# Patient Record
Sex: FEMALE | Race: WHITE | NOT HISPANIC OR LATINO | Employment: FULL TIME | URBAN - NONMETROPOLITAN AREA
[De-identification: names, ages, dates, MRNs, and addresses within clinical notes are randomized per-mention and may not be internally consistent; named-entity substitution may affect disease eponyms.]

---

## 2022-12-02 ENCOUNTER — TRANSCRIBE ORDERS (OUTPATIENT)
Dept: MAMMOGRAPHY | Facility: CLINIC | Age: 57
End: 2022-12-02

## 2022-12-02 DIAGNOSIS — Z12.31 ENCOUNTER FOR SCREENING MAMMOGRAM FOR MALIGNANT NEOPLASM OF BREAST: Primary | ICD-10-CM

## 2022-12-12 ENCOUNTER — OFFICE VISIT (OUTPATIENT)
Dept: FAMILY MEDICINE CLINIC | Facility: CLINIC | Age: 57
End: 2022-12-12

## 2022-12-12 VITALS
OXYGEN SATURATION: 98 % | DIASTOLIC BLOOD PRESSURE: 90 MMHG | HEIGHT: 65 IN | SYSTOLIC BLOOD PRESSURE: 140 MMHG | HEART RATE: 84 BPM | BODY MASS INDEX: 33.66 KG/M2 | WEIGHT: 202 LBS

## 2022-12-12 DIAGNOSIS — R01.1 CARDIAC MURMUR: ICD-10-CM

## 2022-12-12 DIAGNOSIS — E55.9 VITAMIN D DEFICIENCY: ICD-10-CM

## 2022-12-12 DIAGNOSIS — E89.0 HISTORY OF PARTIAL THYROIDECTOMY: ICD-10-CM

## 2022-12-12 DIAGNOSIS — E53.8 VITAMIN B12 DEFICIENCY: ICD-10-CM

## 2022-12-12 DIAGNOSIS — R03.0 ELEVATED BP WITHOUT DIAGNOSIS OF HYPERTENSION: ICD-10-CM

## 2022-12-12 DIAGNOSIS — Z13.1 DIABETES MELLITUS SCREENING: ICD-10-CM

## 2022-12-12 DIAGNOSIS — E66.09 CLASS 1 OBESITY DUE TO EXCESS CALORIES WITHOUT SERIOUS COMORBIDITY WITH BODY MASS INDEX (BMI) OF 33.0 TO 33.9 IN ADULT: ICD-10-CM

## 2022-12-12 DIAGNOSIS — Z00.00 GENERAL MEDICAL EXAM: Primary | ICD-10-CM

## 2022-12-12 DIAGNOSIS — K90.41 GLUTEN INTOLERANCE: ICD-10-CM

## 2022-12-12 PROCEDURE — 99386 PREV VISIT NEW AGE 40-64: CPT | Performed by: FAMILY MEDICINE

## 2022-12-12 NOTE — ASSESSMENT & PLAN NOTE
Reviewed together health maintenance and screening along with vaccination options.    She will consider Tdap and Shingrix.    She will return to get fasting blood work up-to-date.    I did discuss murmur heard on her exam today.  She reports a history of murmur but no prior work-up.  She has experienced some harder heartbeats when laying flat at nighttime and is interested in seeing cardiology to help clarify the etiology of her murmur and consider additional work-up for her nighttime symptoms.    Scheduling consultation with Dr. Madrigal

## 2022-12-12 NOTE — PROGRESS NOTES
"Chief Complaint  Establish Care (Patient is here to establish care. )    Subjective    History of Present Illness:  Izabel Ramírez is a 57 y.o. female who presents today for physical exam and to establish care.    She is transferring to us from [facility].    History of partial thyroidectomy due to goiter and she does continue with monitoring of her thyroid on a regular basis.  She has not required Synthroid or thyroid replacement of any kind.    She does have a history of gluten intolerance/celiac disease.  Symptoms are better with dietary control but she does tend to run low on vitamin B12 and vitamin D.  She is on over-the-counter supplementation.    Colonoscopy up-to-date.    Pap smear up-to-date.    Family history of hypertension and her blood pressures been borderline at past visits.  She has not been working as hard with diet and exercise and plans to restart this.  She would like to restart diet and exercise before medications at this time.    She will return to get fasting blood work up-to-date.        Objective   Vital Signs:   /90 (BP Location: Right arm, Patient Position: Sitting, Cuff Size: Adult)   Pulse 84   Ht 165.1 cm (65\")   Wt 91.6 kg (202 lb)   SpO2 98%   BMI 33.61 kg/m²     Review of Systems   Constitutional: Positive for fatigue. Negative for appetite change, chills and fever.   HENT: Negative for hearing loss.    Eyes: Negative for blurred vision.   Respiratory: Negative for chest tightness.    Cardiovascular: Negative for chest pain.   Gastrointestinal: Negative for abdominal pain.   Musculoskeletal: Negative for gait problem.   Skin: Negative for rash.   Psychiatric/Behavioral: Negative for depressed mood.       Past History:  Medical History: has no past medical history on file.   Surgical History: has no past surgical history on file.   Family History: family history is not on file.   Social History: reports that she has never smoked. She has never used " smokeless tobacco. Alcohol use questions deferred to the physician. She reports that she does not use drugs.    No current outpatient medications on file.    Allergies: Patient has no known allergies.    Physical Exam  Constitutional:       Appearance: She is obese.   HENT:      Head: Normocephalic.      Right Ear: External ear normal.      Left Ear: External ear normal.      Nose: Nose normal.   Eyes:      Pupils: Pupils are equal, round, and reactive to light.   Neck:      Comments: Postop scar from partial thyroidectomy.  No thyroid mass or nodule appreciated  Cardiovascular:      Rate and Rhythm: Normal rate and regular rhythm.      Heart sounds: Murmur heard.     No friction rub. No gallop.      Comments: 2 out of 6 systolic ejection murmur heard loudest at right upper sternal border consistent with aortic valve etiology  Pulmonary:      Effort: Pulmonary effort is normal.      Breath sounds: Normal breath sounds.   Musculoskeletal:         General: Normal range of motion.      Cervical back: Normal range of motion.   Skin:     General: Skin is warm and dry.   Neurological:      General: No focal deficit present.      Mental Status: She is alert.   Psychiatric:         Mood and Affect: Mood normal.         Behavior: Behavior normal.         Thought Content: Thought content normal.          Result Review                   Assessment and Plan  Diagnoses and all orders for this visit:    1. General medical exam (Primary)  Assessment & Plan:  Reviewed together health maintenance and screening along with vaccination options.    She will consider Tdap and Shingrix.    She will return to get fasting blood work up-to-date.    I did discuss murmur heard on her exam today.  She reports a history of murmur but no prior work-up.  She has experienced some harder heartbeats when laying flat at nighttime and is interested in seeing cardiology to help clarify the etiology of her murmur and consider additional work-up for her  nighttime symptoms.    Scheduling consultation with Dr. Madrigal    Orders:  -     CBC Auto Differential; Future  -     Comprehensive Metabolic Panel; Future  -     Lipid Panel; Future  -     TSH; Future  -     T4, Free; Future    2. Vitamin B12 deficiency  Assessment & Plan:  Continues on B12 over-the-counter    Orders:  -     CBC Auto Differential; Future  -     Vitamin B12; Future    3. History of partial thyroidectomy  Assessment & Plan:  We will reassess thyroid function with fasting labs      4. Vitamin D deficiency  Assessment & Plan:  Continues on vitamin D over-the-counter    Orders:  -     Vitamin D,25-Hydroxy; Future    5. Gluten intolerance  Assessment & Plan:  Continue with gluten-free diet      6. Diabetes mellitus screening  -     Hemoglobin A1c; Future    7. Elevated BP without diagnosis of hypertension  Assessment & Plan:  Discussed borderline blood pressure elevation and she wants to work on diet and exercise before medications.    Scheduling cardiology consultation given murmur appreciated on exam.  We did discuss the possibility for aortic stenosis and bicuspid aortic valve.    Orders:  -     Ambulatory Referral to Cardiology    8. Cardiac murmur  Assessment & Plan:  See above.  Referral underway to cardiology    Orders:  -     Ambulatory Referral to Cardiology    9. Class 1 obesity due to excess calories without serious comorbidity with body mass index (BMI) of 33.0 to 33.9 in adult      BMI is >= 30 and <35. (Class 1 Obesity). The following options were offered after discussion;: exercise counseling/recommendations and nutrition counseling/recommendations          Follow Up  No follow-ups on file.    Lionel Sosa MD

## 2022-12-12 NOTE — ASSESSMENT & PLAN NOTE
Discussed borderline blood pressure elevation and she wants to work on diet and exercise before medications.    Scheduling cardiology consultation given murmur appreciated on exam.  We did discuss the possibility for aortic stenosis and bicuspid aortic valve.

## 2022-12-16 ENCOUNTER — LAB (OUTPATIENT)
Dept: FAMILY MEDICINE CLINIC | Facility: CLINIC | Age: 57
End: 2022-12-16

## 2022-12-16 DIAGNOSIS — Z00.00 GENERAL MEDICAL EXAM: ICD-10-CM

## 2022-12-16 DIAGNOSIS — E53.8 VITAMIN B12 DEFICIENCY: ICD-10-CM

## 2022-12-16 DIAGNOSIS — Z13.1 DIABETES MELLITUS SCREENING: ICD-10-CM

## 2022-12-16 DIAGNOSIS — E55.9 VITAMIN D DEFICIENCY: ICD-10-CM

## 2022-12-16 PROCEDURE — 36415 COLL VENOUS BLD VENIPUNCTURE: CPT | Performed by: FAMILY MEDICINE

## 2022-12-17 LAB
25(OH)D3+25(OH)D2 SERPL-MCNC: 46.5 NG/ML (ref 30–100)
ALBUMIN SERPL-MCNC: 4.3 G/DL (ref 3.8–4.9)
ALBUMIN/GLOB SERPL: 2 {RATIO} (ref 1.2–2.2)
ALP SERPL-CCNC: 90 IU/L (ref 44–121)
ALT SERPL-CCNC: 9 IU/L (ref 0–32)
AST SERPL-CCNC: 10 IU/L (ref 0–40)
BASOPHILS # BLD AUTO: 0 X10E3/UL (ref 0–0.2)
BASOPHILS NFR BLD AUTO: 1 %
BILIRUB SERPL-MCNC: 0.5 MG/DL (ref 0–1.2)
BUN SERPL-MCNC: 13 MG/DL (ref 6–24)
BUN/CREAT SERPL: 19 (ref 9–23)
CALCIUM SERPL-MCNC: 9.3 MG/DL (ref 8.7–10.2)
CHLORIDE SERPL-SCNC: 103 MMOL/L (ref 96–106)
CHOLEST SERPL-MCNC: 235 MG/DL (ref 100–199)
CO2 SERPL-SCNC: 26 MMOL/L (ref 20–29)
CREAT SERPL-MCNC: 0.7 MG/DL (ref 0.57–1)
EGFRCR SERPLBLD CKD-EPI 2021: 100 ML/MIN/1.73
EOSINOPHIL # BLD AUTO: 0.4 X10E3/UL (ref 0–0.4)
EOSINOPHIL NFR BLD AUTO: 7 %
ERYTHROCYTE [DISTWIDTH] IN BLOOD BY AUTOMATED COUNT: 12.7 % (ref 11.7–15.4)
GLOBULIN SER CALC-MCNC: 2.2 G/DL (ref 1.5–4.5)
GLUCOSE SERPL-MCNC: 92 MG/DL (ref 70–99)
HBA1C MFR BLD: 5.5 % (ref 4.8–5.6)
HCT VFR BLD AUTO: 43.3 % (ref 34–46.6)
HDLC SERPL-MCNC: 78 MG/DL
HGB BLD-MCNC: 14.3 G/DL (ref 11.1–15.9)
IMM GRANULOCYTES # BLD AUTO: 0 X10E3/UL (ref 0–0.1)
IMM GRANULOCYTES NFR BLD AUTO: 0 %
LDLC SERPL CALC-MCNC: 139 MG/DL (ref 0–99)
LYMPHOCYTES # BLD AUTO: 1.9 X10E3/UL (ref 0.7–3.1)
LYMPHOCYTES NFR BLD AUTO: 34 %
MCH RBC QN AUTO: 30.3 PG (ref 26.6–33)
MCHC RBC AUTO-ENTMCNC: 33 G/DL (ref 31.5–35.7)
MCV RBC AUTO: 92 FL (ref 79–97)
MONOCYTES # BLD AUTO: 0.5 X10E3/UL (ref 0.1–0.9)
MONOCYTES NFR BLD AUTO: 8 %
NEUTROPHILS # BLD AUTO: 2.9 X10E3/UL (ref 1.4–7)
NEUTROPHILS NFR BLD AUTO: 50 %
PLATELET # BLD AUTO: 234 X10E3/UL (ref 150–450)
POTASSIUM SERPL-SCNC: 4.4 MMOL/L (ref 3.5–5.2)
PROT SERPL-MCNC: 6.5 G/DL (ref 6–8.5)
RBC # BLD AUTO: 4.72 X10E6/UL (ref 3.77–5.28)
SODIUM SERPL-SCNC: 141 MMOL/L (ref 134–144)
T4 FREE SERPL-MCNC: 1.2 NG/DL (ref 0.82–1.77)
TRIGL SERPL-MCNC: 104 MG/DL (ref 0–149)
TSH SERPL DL<=0.005 MIU/L-ACNC: 2.59 UIU/ML (ref 0.45–4.5)
VIT B12 SERPL-MCNC: 318 PG/ML (ref 232–1245)
VLDLC SERPL CALC-MCNC: 18 MG/DL (ref 5–40)
WBC # BLD AUTO: 5.7 X10E3/UL (ref 3.4–10.8)

## 2023-02-24 NOTE — PROGRESS NOTES
OFFICE VISIT  NOTE  [facility] CARDIOLOGY [place] [facility]      Name: Izabel Ramírez    Date: 2023  MRN:  [id number]  :  1965      REFERRING/PRIMARY PROVIDER:  Lionel Sosa MD    Chief Complaint   Patient presents with   • Establish Care     Referred by Dr. Sosa for Elevated Blood Pressure       HPI: Izabel Ramírez is a 57 y.o. female who presents today for new consultation for elevated blood pressure without diagnosis of hypertension and cardiac murmur.  She reports previously being told of a heart murmur but no echo.  She reports elevated blood pressure readings, at home they are anywhere from 120s to 150s systolic.  Blood pressure at PCPs office was 140/90 recently.  Denies headache vision change or previous stroke.  She would like to avoid prescription medications if possible.  She does not exercise in the winter but is very active during the summer months.    Past Medical History:   Diagnosis Date   • Heart murmur    • Thyroid disease        Past Surgical History:   Procedure Laterality Date   • DILATATION AND CURETTAGE     • THYROIDECTOMY, PARTIAL     • WISDOM TOOTH EXTRACTION N/A        Social History     Socioeconomic History   • Marital status:    Tobacco Use   • Smoking status: Never   • Smokeless tobacco: Never   Vaping Use   • Vaping Use: Never used   Substance and Sexual Activity   • Alcohol use: Yes     Comment: Rare   • Drug use: Never   • Sexual activity: Defer       Family History   Problem Relation Age of Onset   • Thyroid disease Mother    • Coronary artery disease Mother    • Parkinsonism Father    • Macular degeneration Father    • Thyroid disease Sister         ROS:   Constitutional no fever,  no weight loss   Skin no rash, no subcutaneous nodules   Otolaryngeal no difficulty swallowing   Cardiovascular See HPI   Pulmonary no cough, no sputum production   Gastrointestinal no constipation, no diarrhea   Genitourinary no dysuria, no  "hematuria   Hematologic no easy bruisability, no abnormal bleeding   Musculoskeletal no muscle pain   Neurologic no dizziness, no falls         Allergies   Allergen Reactions   • Gluten Meal Itching     Itching, Rash         Current Outpatient Medications:   •  Ascorbic Acid (VITAMIN C PO), Take 11 mg by mouth. 5 tablets in the morning, 5 tablets in the evening., Disp: , Rfl:   •  COD LIVER OIL PO, Take 3,000 mg by mouth 4 (Four) Times a Day., Disp: , Rfl:   •  Folate-B12-Intrinsic Factor (Intrinsi B12-Folate) 800-500-20 MCG-MCG-MG tablet, Take 1 tablet by mouth Daily., Disp: , Rfl:   •  Misc Natural Products (SUPER GREENS PO), Take  by mouth 2 (Two) Times a Day., Disp: , Rfl:   •  multivitamin with minerals tablet tablet, Take 1 tablet by mouth Daily., Disp: , Rfl:   •  Unable to find, Med Name: Prolamine Iodine 30mg/600mcg 1 tablet BID, Disp: , Rfl:   •  Unable to find, Med Name: CardioPlus 1 tablet 7 times a day., Disp: , Rfl:   •  VITAMIN A PO, Take 450 mcg by mouth. 5 tablets in the morning, 5 tablets in the evening., Disp: , Rfl:     Vitals:    02/27/23 1438   BP: 156/96   BP Location: Right arm   Patient Position: Sitting   Cuff Size: Adult   Pulse: 79   SpO2: 95%   Weight: 90.3 kg (199 lb)   Height: 165.1 cm (65\")     Body mass index is 33.12 kg/m².    PHYSICAL EXAM:    General Appearance:   · well developed  · well nourished  HENT:   · oropharynx moist  · lips not cyanotic  Neck:  · thyroid not enlarged  · supple  Respiratory:  · no respiratory distress  · normal breath sounds  · no rales  Cardiovascular:  · no jugular venous distention  · regular rhythm  · apical impulse normal  · S1 normal, S2 normal  · no S3, no S4   · 2/6 systolic murmur  · no rub, no thrill  · carotid pulses normal; no bruit  · lower extremity edema: none      Musculoskeletal:  · no clubbing of fingers.   · normocephalic, head atraumatic  Skin:   · warm, dry  Psychiatric:  · judgement and insight appropriate  · normal mood and " affect    RESULTS:     ECG 12 Lead    Date/Time: 2/27/2023 3:10 PM  Performed by: Maksim Madrigal MD  Authorized by: Maksim Madrigal MD   Comparison: not compared with previous ECG   Previous ECG: no previous ECG available  Rhythm: sinus rhythm  Rate: normal  QRS axis: normal    Clinical impression: non-specific ECG                  Labs:  Lab Results   Component Value Date    TRIG 104 12/16/2022    HDL 78 12/16/2022     (H) 12/16/2022    AST 10 12/16/2022    ALT 9 12/16/2022     Lab Results   Component Value Date    HGBA1C 5.5 12/16/2022     No components found for: CREATINININE  No results found for: EGFRIFNONA    Most recent PCP note, imaging tests, and labs reviewed.    ASSESSMENT:  Problem List Items Addressed This Visit        Cardiac and Vasculature    Elevated BP without diagnosis of hypertension - Primary    Relevant Orders    Adult Transthoracic Echo Complete w/ Color, Spectral and Contrast if necessary per protocol    Cardiac murmur    Relevant Orders    Adult Transthoracic Echo Complete w/ Color, Spectral and Contrast if necessary per protocol       PLAN:    1.  Cardiac murmur:  Systolic murmur, possible aortic sclerosis, proceed with echocardiogram for further assessment    2.  Elevated blood pressure reading:  Elevated in the office today as well as at PCP office, however at home she states is better usually in the 130-140 range    She states she would like to avoid prescription medicines if possible, at this time she would like to try diet and exercise, she will continue low-sodium diet and increase aerobic exercise and work on weight loss.    If these measures do not work I would recommend HCTZ 12.5 mg daily    Goal blood pressure less than 130/80      Return to clinic in 6 months, or sooner as needed.    Thank you for the opportunity to share in the care of your patient; please do not hesitate to call me with any questions.     Maksim Madrigal MD, [facility]  Office: [phone] [address] [address]    02/27/23

## 2023-02-27 ENCOUNTER — OFFICE VISIT (OUTPATIENT)
Dept: CARDIOLOGY | Facility: CLINIC | Age: 58
End: 2023-02-27
Payer: COMMERCIAL

## 2023-02-27 VITALS
HEIGHT: 65 IN | SYSTOLIC BLOOD PRESSURE: 156 MMHG | WEIGHT: 199 LBS | DIASTOLIC BLOOD PRESSURE: 96 MMHG | OXYGEN SATURATION: 95 % | BODY MASS INDEX: 33.15 KG/M2 | HEART RATE: 79 BPM

## 2023-02-27 DIAGNOSIS — R01.1 CARDIAC MURMUR: ICD-10-CM

## 2023-02-27 DIAGNOSIS — R03.0 ELEVATED BP WITHOUT DIAGNOSIS OF HYPERTENSION: Primary | ICD-10-CM

## 2023-02-27 PROCEDURE — 93000 ELECTROCARDIOGRAM COMPLETE: CPT | Performed by: INTERNAL MEDICINE

## 2023-02-27 PROCEDURE — 99204 OFFICE O/P NEW MOD 45 MIN: CPT | Performed by: INTERNAL MEDICINE

## 2023-02-27 RX ORDER — MULTIPLE VITAMINS W/ MINERALS TAB 9MG-400MCG
1 TAB ORAL DAILY
COMMUNITY

## 2023-02-27 RX ORDER — VIT B12/INTRINSIC FACT/FOLATE 500-20-800
1 TABLET ORAL DAILY
COMMUNITY

## 2023-03-30 ENCOUNTER — TELEPHONE (OUTPATIENT)
Dept: CARDIOLOGY | Facility: CLINIC | Age: 58
End: 2023-03-30
Payer: COMMERCIAL

## 2023-03-30 NOTE — TELEPHONE ENCOUNTER
----- Message from Maksim Madrigal MD sent at 3/30/2023  7:42 AM EDT -----  Please inform the patient of their test results. Thank you.

## 2023-09-13 ENCOUNTER — OFFICE VISIT (OUTPATIENT)
Dept: CARDIOLOGY | Facility: CLINIC | Age: 58
End: 2023-09-13
Payer: COMMERCIAL

## 2023-09-13 VITALS
HEIGHT: 65 IN | SYSTOLIC BLOOD PRESSURE: 122 MMHG | DIASTOLIC BLOOD PRESSURE: 68 MMHG | OXYGEN SATURATION: 97 % | BODY MASS INDEX: 31.99 KG/M2 | WEIGHT: 192 LBS | HEART RATE: 70 BPM

## 2023-09-13 DIAGNOSIS — R03.0 ELEVATED BP WITHOUT DIAGNOSIS OF HYPERTENSION: Primary | ICD-10-CM

## 2023-09-13 DIAGNOSIS — R01.1 CARDIAC MURMUR: ICD-10-CM

## 2023-09-13 PROCEDURE — 99213 OFFICE O/P EST LOW 20 MIN: CPT | Performed by: INTERNAL MEDICINE

## 2023-09-13 NOTE — PROGRESS NOTES
OFFICE VISIT  NOTE  [facility] CARDIOLOGY      Name: Izabel Ramírez    Date: 2023  MRN:  [id number]  :  1965      REFERRING/PRIMARY PROVIDER:  Lionel Sosa MD    Chief Complaint   Patient presents with    elvated BP without dianosis of hypertension       HPI: Izabel Ramírez is a 58 y.o. female who presents today for elevated blood pressure without diagnosis of hypertension and cardiac murmur.    Echo 3/30/2023, EF 70%, mild LVH, normal RVSP.  Blood pressure much better controlled she is working on diet and exercise.  No chest pain shortness of breath or palpitations.    Past Medical History:   Diagnosis Date    Heart murmur     Thyroid disease        Past Surgical History:   Procedure Laterality Date    DILATATION AND CURETTAGE      THYROIDECTOMY, PARTIAL      WISDOM TOOTH EXTRACTION N/A        Social History     Socioeconomic History    Marital status:    Tobacco Use    Smoking status: Never    Smokeless tobacco: Never   Vaping Use    Vaping Use: Never used   Substance and Sexual Activity    Alcohol use: Yes     Comment: Rare    Drug use: Never    Sexual activity: Defer       Family History   Problem Relation Age of Onset    Thyroid disease Mother     Coronary artery disease Mother     Parkinsonism Father     Macular degeneration Father     Thyroid disease Sister         ROS:   Constitutional no fever,  no weight loss   Skin no rash, no subcutaneous nodules   Otolaryngeal no difficulty swallowing   Cardiovascular See HPI   Pulmonary no cough, no sputum production   Gastrointestinal no constipation, no diarrhea   Genitourinary no dysuria, no hematuria   Hematologic no easy bruisability, no abnormal bleeding   Musculoskeletal no muscle pain   Neurologic no dizziness, no falls         Allergies   Allergen Reactions    Gluten Meal Itching     Itching, Rash         Current Outpatient Medications:     Ascorbic Acid (VITAMIN C PO), Take 11 mg by mouth. 5 tablets in  "the morning, 5 tablets in the evening., Disp: , Rfl:     COD LIVER OIL PO, Take 3,000 mg by mouth 4 (Four) Times a Day., Disp: , Rfl:     Folate-B12-Intrinsic Factor (Intrinsi B12-Folate) 800-500-20 MCG-MCG-MG tablet, Take 1 tablet by mouth Daily., Disp: , Rfl:     Misc Natural Products (SUPER GREENS PO), Take  by mouth 2 (Two) Times a Day., Disp: , Rfl:     multivitamin with minerals tablet tablet, Take 1 tablet by mouth Daily., Disp: , Rfl:     Unable to find, Med Name: Prolamine Iodine 30mg/600mcg 1 tablet BID, Disp: , Rfl:     Unable to find, Med Name: CardioPlus 1 tablet 7 times a day., Disp: , Rfl:     VITAMIN A PO, Take 450 mcg by mouth. 3 tablets daily, Disp: , Rfl:     Vitals:    09/13/23 1448   BP: 122/68   BP Location: Right arm   Patient Position: Sitting   Pulse: 70   SpO2: 97%   Weight: 87.1 kg (192 lb)   Height: 165.1 cm (65\")     Body mass index is 31.95 kg/m².    PHYSICAL EXAM:    General Appearance:   well developed  well nourished  HENT:   oropharynx moist  lips not cyanotic  Neck:  thyroid not enlarged  supple  Respiratory:  no respiratory distress  normal breath sounds  no rales  Cardiovascular:  no jugular venous distention  regular rhythm  apical impulse normal  S1 normal, S2 normal  no S3, no S4   No murmur  no rub, no thrill  carotid pulses normal; no bruit  lower extremity edema: none      Musculoskeletal:  no clubbing of fingers.   normocephalic, head atraumatic  Skin:   warm, dry  Psychiatric:  judgement and insight appropriate  normal mood and affect    RESULTS:   Procedures    Results for orders placed in visit on 03/29/23    Adult Transthoracic Echo Complete w/ Color, Spectral and Contrast if necessary per protocol    Interpretation Summary    Left ventricular systolic function is normal. Calculated left ventricular EF = 70%    Left ventricular wall thickness is consistent with mild concentric hypertrophy.    Left ventricular diastolic function was indeterminate.    Estimated right " ventricular systolic pressure from tricuspid regurgitation is normal (<35 mmHg).      Labs:  Lab Results   Component Value Date    TRIG 104 12/16/2022    HDL 78 12/16/2022     (H) 12/16/2022    AST 10 12/16/2022    ALT 9 12/16/2022     Lab Results   Component Value Date    HGBA1C 5.5 12/16/2022     No components found for: CREATINININE  No results found for: EGFRIFNONA    Most recent PCP note, imaging tests, and labs reviewed.    ASSESSMENT:  Problem List Items Addressed This Visit          Cardiac and Vasculature    Elevated BP without diagnosis of hypertension - Primary    Cardiac murmur       PLAN:    1.  Cardiac murmur:  Not present today, likely benign flow murmur echo personally reviewed no valvular disease.      2.  Elevated blood pressure reading:  Well-controlled today, goal blood pressure less than 130/80 especially in light of mild LVH on echo    She will continue working on low-sodium diet, exercise and weight loss.    No need for prescription medication at this time.    3.  Mild left ventricular hypertrophy:  Continue aggressive blood pressure monitoring and goal of less than 130/80    Return to clinic as needed.    Thank you for the opportunity to share in the care of your patient; please do not hesitate to call me with any questions.     Maksim aMdrigal MD, [facility]  Office: [phone] [address] [place] [address]    09/13/23

## 2024-03-20 ENCOUNTER — OFFICE VISIT (OUTPATIENT)
Dept: FAMILY MEDICINE CLINIC | Facility: CLINIC | Age: 59
End: 2024-03-20
Payer: COMMERCIAL

## 2024-03-20 VITALS
OXYGEN SATURATION: 98 % | SYSTOLIC BLOOD PRESSURE: 120 MMHG | HEIGHT: 65 IN | HEART RATE: 79 BPM | WEIGHT: 204.6 LBS | DIASTOLIC BLOOD PRESSURE: 80 MMHG | BODY MASS INDEX: 34.09 KG/M2

## 2024-03-20 DIAGNOSIS — E89.0 HISTORY OF PARTIAL THYROIDECTOMY: ICD-10-CM

## 2024-03-20 DIAGNOSIS — Z13.1 DIABETES MELLITUS SCREENING: ICD-10-CM

## 2024-03-20 DIAGNOSIS — Z00.00 GENERAL MEDICAL EXAM: Primary | ICD-10-CM

## 2024-03-20 DIAGNOSIS — E53.8 VITAMIN B12 DEFICIENCY: ICD-10-CM

## 2024-03-20 DIAGNOSIS — E55.9 VITAMIN D DEFICIENCY: ICD-10-CM

## 2024-03-20 DIAGNOSIS — E66.09 CLASS 1 OBESITY DUE TO EXCESS CALORIES WITHOUT SERIOUS COMORBIDITY WITH BODY MASS INDEX (BMI) OF 34.0 TO 34.9 IN ADULT: ICD-10-CM

## 2024-03-20 DIAGNOSIS — Z11.59 NEED FOR HEPATITIS C SCREENING TEST: ICD-10-CM

## 2024-03-20 PROBLEM — E66.811 CLASS 1 OBESITY DUE TO EXCESS CALORIES WITHOUT SERIOUS COMORBIDITY WITH BODY MASS INDEX (BMI) OF 34.0 TO 34.9 IN ADULT: Status: ACTIVE | Noted: 2024-03-20

## 2024-03-20 NOTE — ASSESSMENT & PLAN NOTE
Reviewed together health maintenance and screening along with vaccination options.     She will consider Tdap, Shingrix, and Arexy.     Patient encouraged to keep appointment for mammogram.

## 2024-03-20 NOTE — PROGRESS NOTES
Female Physical Note      Date: 2024   Patient Name: Izabel Ramírez  : 1965   MRN: [id number]     Chief Complaint:    Chief Complaint   Patient presents with    pt here for labs       History of Present Illness: Izabel Ramírez is a 58 y.o. female who is here today for their annual health maintenance and physical.     She just had her pap smear at [facility], and she has to have a colpo.     She is currently up-to-date on her mammogram, but she will be scheduling it when due after 24.    She states that she just had her eye exam a few weeks ago, and everything was normal.      Subjective      Review of Systems:   Review of Systems   Constitutional:  Negative for activity change, appetite change, fatigue, unexpected weight gain and unexpected weight loss.   HENT: Negative.     Eyes: Negative.    Respiratory: Negative.     Cardiovascular: Negative.    Gastrointestinal: Negative.        Past Medical History, Social History, Family History and Care Team were all reviewed with patient and updated as appropriate.     Medications:     Current Outpatient Medications:     COD LIVER OIL PO, Take 3,000 mg by mouth 4 (Four) Times a Day., Disp: , Rfl:     Folate-B12-Intrinsic Factor (Intrinsi B12-Folate) 800-500-20 MCG-MCG-MG tablet, Take 1 tablet by mouth Daily., Disp: , Rfl:     Misc Natural Products (SUPER GREENS PO), Take  by mouth 2 (Two) Times a Day., Disp: , Rfl:     multivitamin with minerals tablet tablet, Take 1 tablet by mouth Daily., Disp: , Rfl:     VITAMIN A PO, Take 450 mcg by mouth. 3 tablets daily, Disp: , Rfl:     Ascorbic Acid (VITAMIN C PO), Take 11 mg by mouth. 5 tablets in the morning, 5 tablets in the evening., Disp: , Rfl:     Allergies:   Allergies   Allergen Reactions    Gluten Meal Itching     Itching, Rash       Health Maintenance   Topic Date Due    HEPATITIS C SCREENING  Never done    ANNUAL PHYSICAL  2023    ZOSTER VACCINE (1 of 2) 2024 (Originally  "4/13/2013)    COVID-19 Vaccine (1 - 2023-24 season) 03/22/2024 (Originally 9/1/2023)    INFLUENZA VACCINE  03/31/2024 (Originally 8/1/2023)    TDAP/TD VACCINES (1 - Tdap) 03/10/2025 (Originally 4/13/1982)    RSV Vaccine - Adults (1 - 1-dose 60+ series) 03/20/2025 (Originally 4/13/2023)    BMI FOLLOWUP  03/20/2025    MAMMOGRAM  04/05/2025    PAP SMEAR  03/05/2027    COLORECTAL CANCER SCREENING  01/03/2032    Pneumococcal Vaccine 0-64  Aged Out     Diet/Physical activity:   She is doing an autoimmune diet currently.     She tried to get an under-the-desk treadmill, but she has not been able to get it upstairs due to weight. She states that they try to walk when weather permits, and she likes to garden and generally stay active.    Sexual Health: Denies concerns.     Intimate partner violence: Denies concerns.    Objective     Physical Exam:  Vital Signs:   Vitals:    03/20/24 0849   BP: 120/80   BP Location: Left arm   Patient Position: Sitting   Cuff Size: Large Adult   Pulse: 79   SpO2: 98%   Weight: 92.8 kg (204 lb 9.6 oz)   Height: 165.1 cm (65\")     Body mass index is 34.05 kg/m².     Physical Exam  Vitals and nursing note reviewed.   Constitutional:       General: She is not in acute distress.     Appearance: Normal appearance. She is not ill-appearing.   HENT:      Head: Normocephalic and atraumatic.      Right Ear: Tympanic membrane, ear canal and external ear normal.      Left Ear: Tympanic membrane, ear canal and external ear normal.      Nose: Nose normal.      Mouth/Throat:      Mouth: Mucous membranes are moist.      Pharynx: Oropharynx is clear. No oropharyngeal exudate or posterior oropharyngeal erythema.   Eyes:      Extraocular Movements: Extraocular movements intact.      Conjunctiva/sclera: Conjunctivae normal.      Pupils: Pupils are equal, round, and reactive to light.   Cardiovascular:      Rate and Rhythm: Normal rate and regular rhythm.      Heart sounds: Normal heart sounds.   Pulmonary:      " Effort: Pulmonary effort is normal.      Breath sounds: Normal breath sounds.   Abdominal:      General: Bowel sounds are normal.      Palpations: Abdomen is soft. There is no mass.      Tenderness: There is no abdominal tenderness. There is no right CVA tenderness, left CVA tenderness or guarding.      Hernia: No hernia is present.   Musculoskeletal:      Cervical back: Normal range of motion and neck supple.      Right lower leg: No edema.      Left lower leg: No edema.   Lymphadenopathy:      Cervical: No cervical adenopathy.   Skin:     General: Skin is warm.   Neurological:      General: No focal deficit present.      Mental Status: She is alert and oriented to person, place, and time.      Motor: No weakness.      Coordination: Coordination normal.      Gait: Gait normal.   Psychiatric:         Mood and Affect: Mood normal.         Behavior: Behavior normal.       PHQ-2 Depression Screening  Little interest or pleasure in doing things? 0-->not at all   Feeling down, depressed, or hopeless? 0-->not at all   PHQ-2 Total Score 0        Assessment / Plan      Assessment/Plan:   Diagnoses and all orders for this visit:    1. General medical exam (Primary)  Assessment & Plan:  Reviewed together health maintenance and screening along with vaccination options.     She will consider Tdap, Shingrix, and Arexy.     Patient encouraged to keep appointment for mammogram.    Orders:  -     CBC Auto Differential; Future  -     Comprehensive Metabolic Panel; Future  -     Lipid Panel; Future  -     Hemoglobin A1c; Future  -     TSH; Future  -     T4, Free; Future  -     HCV Antibody Rfx To Qnt PCR; Future  -     Vitamin D,25-Hydroxy; Future  -     Vitamin B12; Future  -     CBC Auto Differential  -     Comprehensive Metabolic Panel  -     Lipid Panel  -     Hemoglobin A1c  -     TSH  -     T4, Free  -     HCV Antibody Rfx To Qnt PCR  -     Vitamin D,25-Hydroxy  -     Vitamin B12    2. History of partial thyroidectomy  -      CBC Auto Differential; Future  -     Comprehensive Metabolic Panel; Future  -     TSH; Future  -     T4, Free; Future  -     CBC Auto Differential  -     Comprehensive Metabolic Panel  -     TSH  -     T4, Free    3. Vitamin B12 deficiency  -     Vitamin B12; Future  -     Vitamin B12    4. Vitamin D deficiency  -     Vitamin D,25-Hydroxy; Future  -     Vitamin D,25-Hydroxy    5. Class 1 obesity due to excess calories without serious comorbidity with body mass index (BMI) of 34.0 to 34.9 in adult  Assessment & Plan:  Patient's (Body mass index is 34.05 kg/m².) indicates that they are obese (BMI >30) with health conditions that include none . Weight is worsening. BMI  is above average; BMI management plan is completed. We discussed portion control and increasing exercise.       6. Need for hepatitis C screening test  -     HCV Antibody Rfx To Qnt PCR; Future  -     HCV Antibody Rfx To Qnt PCR    7. Diabetes mellitus screening  -     Hemoglobin A1c; Future  -     Hemoglobin A1c        Follow Up:   Return in about 1 year (around 3/20/2025) for Annual Physical.    Healthcare Maintenance:   Discussed injury prevention, diet and exercise, safe sexual practices, and screening for common diseases. Encouraged use of sunscreen and seatbelts. Encouraged SBE, avoidance of tobacco, limiting alcohol, and yearly dental and eye exams.   Izabel Ramírez voices understanding and acceptance of this advice and will call back with any further questions or concerns. AVS with preventive healthcare tips printed for patient.     Nidia Croft PA-C  [facility]

## 2024-03-20 NOTE — ASSESSMENT & PLAN NOTE
Patient's (Body mass index is 34.05 kg/m².) indicates that they are obese (BMI >30) with health conditions that include none . Weight is worsening. BMI  is above average; BMI management plan is completed. We discussed portion control and increasing exercise.

## 2024-03-21 LAB
25(OH)D3+25(OH)D2 SERPL-MCNC: 33.9 NG/ML (ref 30–100)
ALBUMIN SERPL-MCNC: 4.6 G/DL (ref 3.8–4.9)
ALBUMIN/GLOB SERPL: 1.9 {RATIO} (ref 1.2–2.2)
ALP SERPL-CCNC: 95 IU/L (ref 44–121)
ALT SERPL-CCNC: 12 IU/L (ref 0–32)
AST SERPL-CCNC: 12 IU/L (ref 0–40)
BASOPHILS # BLD AUTO: 0 X10E3/UL (ref 0–0.2)
BASOPHILS NFR BLD AUTO: 1 %
BILIRUB SERPL-MCNC: 0.5 MG/DL (ref 0–1.2)
BUN SERPL-MCNC: 9 MG/DL (ref 8–27)
BUN/CREAT SERPL: 11 (ref 12–28)
CALCIUM SERPL-MCNC: 9.7 MG/DL (ref 8.7–10.3)
CHLORIDE SERPL-SCNC: 103 MMOL/L (ref 96–106)
CHOLEST SERPL-MCNC: 252 MG/DL (ref 100–199)
CO2 SERPL-SCNC: 25 MMOL/L (ref 20–29)
CREAT SERPL-MCNC: 0.82 MG/DL (ref 0.57–1)
EGFRCR SERPLBLD CKD-EPI 2021: 82 ML/MIN/1.73
EOSINOPHIL # BLD AUTO: 0.2 X10E3/UL (ref 0–0.4)
EOSINOPHIL NFR BLD AUTO: 5 %
ERYTHROCYTE [DISTWIDTH] IN BLOOD BY AUTOMATED COUNT: 12.7 % (ref 11.7–15.4)
GLOBULIN SER CALC-MCNC: 2.4 G/DL (ref 1.5–4.5)
GLUCOSE SERPL-MCNC: 89 MG/DL (ref 70–99)
HBA1C MFR BLD: 5.4 % (ref 4.8–5.6)
HCT VFR BLD AUTO: 45.5 % (ref 34–46.6)
HCV AB SERPL QL IA: NORMAL
HCV IGG SERPL QL IA: NON REACTIVE
HDLC SERPL-MCNC: 86 MG/DL
HGB BLD-MCNC: 15.1 G/DL (ref 11.1–15.9)
IMM GRANULOCYTES # BLD AUTO: 0 X10E3/UL (ref 0–0.1)
IMM GRANULOCYTES NFR BLD AUTO: 0 %
LDLC SERPL CALC-MCNC: 148 MG/DL (ref 0–99)
LYMPHOCYTES # BLD AUTO: 1.8 X10E3/UL (ref 0.7–3.1)
LYMPHOCYTES NFR BLD AUTO: 36 %
MCH RBC QN AUTO: 30.1 PG (ref 26.6–33)
MCHC RBC AUTO-ENTMCNC: 33.2 G/DL (ref 31.5–35.7)
MCV RBC AUTO: 91 FL (ref 79–97)
MONOCYTES # BLD AUTO: 0.5 X10E3/UL (ref 0.1–0.9)
MONOCYTES NFR BLD AUTO: 9 %
NEUTROPHILS # BLD AUTO: 2.4 X10E3/UL (ref 1.4–7)
NEUTROPHILS NFR BLD AUTO: 49 %
PLATELET # BLD AUTO: 270 X10E3/UL (ref 150–450)
POTASSIUM SERPL-SCNC: 4.5 MMOL/L (ref 3.5–5.2)
PROT SERPL-MCNC: 7 G/DL (ref 6–8.5)
RBC # BLD AUTO: 5.01 X10E6/UL (ref 3.77–5.28)
SODIUM SERPL-SCNC: 144 MMOL/L (ref 134–144)
T4 FREE SERPL-MCNC: 1.53 NG/DL (ref 0.82–1.77)
TRIGL SERPL-MCNC: 104 MG/DL (ref 0–149)
TSH SERPL DL<=0.005 MIU/L-ACNC: 1.75 UIU/ML (ref 0.45–4.5)
VIT B12 SERPL-MCNC: 431 PG/ML (ref 232–1245)
VLDLC SERPL CALC-MCNC: 18 MG/DL (ref 5–40)
WBC # BLD AUTO: 4.9 X10E3/UL (ref 3.4–10.8)

## 2025-06-03 ENCOUNTER — TELEPHONE (OUTPATIENT)
Dept: FAMILY MEDICINE CLINIC | Facility: CLINIC | Age: 60
End: 2025-06-03
Payer: COMMERCIAL

## 2025-06-03 DIAGNOSIS — E55.9 VITAMIN D DEFICIENCY: ICD-10-CM

## 2025-06-03 DIAGNOSIS — E53.8 VITAMIN B12 DEFICIENCY: ICD-10-CM

## 2025-06-03 DIAGNOSIS — Z00.00 GENERAL MEDICAL EXAM: Primary | ICD-10-CM

## 2025-06-03 DIAGNOSIS — E89.0 HISTORY OF PARTIAL THYROIDECTOMY: ICD-10-CM

## 2025-06-03 DIAGNOSIS — Z13.1 DIABETES MELLITUS SCREENING: ICD-10-CM

## 2025-06-03 NOTE — TELEPHONE ENCOUNTER
Spoke with patient who advised she is wanting a full thyroid panel and lab work. She is wanting to know if she should come in prior to her appt for those labs. I explained to the patient that Dr. Sosa has not personally seen her since December of 2022 and would more than likely want to see her before ordering labs. Please advise if patient can do labs prior to appt.

## 2025-06-09 NOTE — TELEPHONE ENCOUNTER
Bloodwork ordered for fasting labs and full thyroid labs that she can do 1 week before her visit in Oct - but please make sure she keeps her appointment in October for her physical.

## 2025-06-09 NOTE — TELEPHONE ENCOUNTER
"CMA called pt to let pt know that pt's pcp states:    \"Bloodwork ordered for fasting labs and full thyroid labs that she can do 1 week before her visit in Oct - but please make sure she keeps her appointment in October for her physical.\"       Pt verbally understood.  "